# Patient Record
Sex: FEMALE | Race: WHITE
[De-identification: names, ages, dates, MRNs, and addresses within clinical notes are randomized per-mention and may not be internally consistent; named-entity substitution may affect disease eponyms.]

---

## 2021-06-18 ENCOUNTER — HOSPITAL ENCOUNTER (OUTPATIENT)
Dept: HOSPITAL 53 - M SLEEP HO | Age: 36
End: 2021-06-18
Attending: INTERNAL MEDICINE
Payer: COMMERCIAL

## 2021-06-18 DIAGNOSIS — G47.33: Primary | ICD-10-CM

## 2021-06-21 NOTE — SLEEPHOME
DATE:  06/18/2021



ORDERED BY:  Dr. Lee. 



Diagnostic home sleep testing was performed due to concern for the obstructive

sleep apnea syndrome in this patient with a history of fatigue and frequent

napping. 



For testing, a nocturnal T3 respiratory monitoring device was used. Continuous

record was made of pulse, oxygen saturation, air flow, chest and abdominal

strain, and body position.



Seven hours and 51 minutes of data were reviewed. There were 5 hours and 49

minutes marked as time in bed. During the interval marked time in bed, there

were only 48 respiratory events identified of 10 seconds in duration or greater

for a respiratory event index of 8.3. The events were primarily obstructive.

Baseline pulse rate 63. Pulse rate ranged 46 to 192. Baseline saturation was

94%.  Saturations fell to 80%. Testing was performed in both the supine and

nonsupine positions.



IMPRESSION: Abnormal home sleep testing with repetitive respiratory events and

oxygen desaturations to 80% with a respiratory event index of 8.3 is consistent

with the obstructive sleep apnea syndrome. 



RECOMMENDATION:  The patient should be encouraged to undergo a formal sleep

evaluation.

## 2024-10-25 ENCOUNTER — HOSPITAL ENCOUNTER (OUTPATIENT)
Dept: HOSPITAL 53 - M SFHCRHEU | Age: 39
End: 2024-10-25
Attending: INTERNAL MEDICINE
Payer: COMMERCIAL

## 2024-10-25 DIAGNOSIS — E55.9: Primary | ICD-10-CM

## 2024-10-25 DIAGNOSIS — M25.40: ICD-10-CM

## 2024-10-25 DIAGNOSIS — R76.8: ICD-10-CM

## 2024-10-25 LAB
25(OH)D3 SERPL-MCNC: 19.8 NG/ML (ref 20–100)
ALBUMIN SERPL BCG-MCNC: 3.9 G/DL (ref 3.2–5.2)
ALP SERPL-CCNC: 69 U/L (ref 35–104)
ALT SERPL W P-5'-P-CCNC: 23 U/L (ref 7–40)
APPEARANCE UR: (no result)
AST SERPL-CCNC: 14 U/L (ref ?–34)
BACTERIA UR QL AUTO: NEGATIVE
BASOPHILS # BLD AUTO: 0.1 10^3/UL (ref 0–0.2)
BASOPHILS NFR BLD AUTO: 0.6 % (ref 0–1)
BILIRUB CONJ SERPL-MCNC: < 0.1 MG/DL (ref ?–0.4)
BILIRUB SERPL-MCNC: 0.3 MG/DL (ref 0.3–1.2)
BILIRUB UR QL STRIP.AUTO: NEGATIVE
BUN SERPL-MCNC: 9 MG/DL (ref 9–23)
C3 SERPL-MCNC: 116.5 MG/DL (ref 90–170)
C4 SERPL-MCNC: 29.9 MG/DL (ref 12–36)
CALCIUM SERPL-MCNC: 9.4 MG/DL (ref 8.5–10.1)
CHLORIDE SERPL-SCNC: 108 MMOL/L (ref 98–107)
CO2 SERPL-SCNC: 26 MMOL/L (ref 20–31)
CREAT SERPL-MCNC: 0.68 MG/DL (ref 0.55–1.3)
CREAT UR-MCNC: 54 MG/DL
CRP SERPL-MCNC: < 0.4 MG/DL (ref ?–1)
EOSINOPHIL # BLD AUTO: 0.2 10^3/UL (ref 0–0.5)
EOSINOPHIL NFR BLD AUTO: 2.6 % (ref 0–3)
ERYTHROCYTE [SEDIMENTATION RATE] IN BLOOD BY WESTERGREN METHOD: 20 MM/HR (ref 0–20)
GFR SERPL CREATININE-BSD FRML MDRD: > 60 ML/MIN/{1.73_M2} (ref 58–?)
GLUCOSE SERPL-MCNC: 97 MG/DL (ref 60–100)
GLUCOSE UR QL STRIP.AUTO: NEGATIVE MG/DL
HCT VFR BLD AUTO: 41.5 % (ref 36–47)
HGB BLD-MCNC: 14.1 G/DL (ref 12–15.5)
HGB UR QL STRIP.AUTO: NEGATIVE
KETONES UR QL STRIP.AUTO: NEGATIVE MG/DL
LEUKOCYTE ESTERASE UR QL STRIP.AUTO: NEGATIVE
LYMPHOCYTES # BLD AUTO: 2.2 10^3/UL (ref 1.5–5)
LYMPHOCYTES NFR BLD AUTO: 24.8 % (ref 24–44)
MCH RBC QN AUTO: 30.4 PG (ref 27–33)
MCHC RBC AUTO-ENTMCNC: 34 G/DL (ref 32–36.5)
MCV RBC AUTO: 89.4 FL (ref 80–96)
MONOCYTES # BLD AUTO: 0.7 10^3/UL (ref 0–0.8)
MONOCYTES NFR BLD AUTO: 7.7 % (ref 2–8)
MUCOUS THREADS URNS QL MICRO: (no result)
NEUTROPHILS # BLD AUTO: 5.6 10^3/UL (ref 1.5–8.5)
NEUTROPHILS NFR BLD AUTO: 63.8 % (ref 36–66)
NITRITE UR QL STRIP.AUTO: NEGATIVE
PH UR STRIP.AUTO: 8 UNITS (ref 5–9)
PLATELET # BLD AUTO: 285 10^3/UL (ref 150–450)
POTASSIUM SERPL-SCNC: 4.1 MMOL/L (ref 3.5–5.1)
PROT SERPL-MCNC: 7.5 G/DL (ref 5.7–8.2)
PROT UR QL STRIP.AUTO: NEGATIVE MG/DL
PROT UR-MCNC: 10 MG/DL (ref 0–14)
RBC # BLD AUTO: 4.64 10^6/UL (ref 4–5.4)
RBC # UR AUTO: 1 /HPF (ref 0–3)
SODIUM SERPL-SCNC: 139 MMOL/L (ref 136–145)
SP GR UR STRIP.AUTO: 1.01 (ref 1–1.03)
SQUAMOUS #/AREA URNS AUTO: 2 /HPF (ref 0–6)
UROBILINOGEN UR QL STRIP.AUTO: 0.2 MG/DL (ref 0–2)
WBC # BLD AUTO: 8.8 10^3/UL (ref 4–10)
WBC #/AREA URNS AUTO: 1 /HPF (ref 0–3)